# Patient Record
Sex: MALE | Race: WHITE | NOT HISPANIC OR LATINO | Employment: FULL TIME | URBAN - METROPOLITAN AREA
[De-identification: names, ages, dates, MRNs, and addresses within clinical notes are randomized per-mention and may not be internally consistent; named-entity substitution may affect disease eponyms.]

---

## 2021-04-16 ENCOUNTER — PATIENT MESSAGE (OUTPATIENT)
Dept: RESEARCH | Facility: HOSPITAL | Age: 36
End: 2021-04-16

## 2022-01-12 ENCOUNTER — HOSPITAL ENCOUNTER (EMERGENCY)
Facility: HOSPITAL | Age: 37
Discharge: HOME | End: 2022-01-12
Attending: EMERGENCY MEDICINE
Payer: COMMERCIAL

## 2022-01-12 VITALS
BODY MASS INDEX: 29.82 KG/M2 | HEART RATE: 84 BPM | WEIGHT: 225 LBS | SYSTOLIC BLOOD PRESSURE: 136 MMHG | TEMPERATURE: 99 F | HEIGHT: 73 IN | DIASTOLIC BLOOD PRESSURE: 81 MMHG | RESPIRATION RATE: 20 BRPM | OXYGEN SATURATION: 98 %

## 2022-01-12 DIAGNOSIS — R00.2 PALPITATIONS: ICD-10-CM

## 2022-01-12 DIAGNOSIS — R53.83 FATIGUE: ICD-10-CM

## 2022-01-12 DIAGNOSIS — T75.4XXA ELECTROCUTION: Primary | ICD-10-CM

## 2022-01-12 PROBLEM — R07.9 CHEST PAIN: Status: ACTIVE | Noted: 2022-01-12

## 2022-01-12 LAB
ALBUMIN SERPL BCP-MCNC: 4.5 G/DL (ref 3.5–5.2)
ALP SERPL-CCNC: 88 U/L (ref 55–135)
ALT SERPL W/O P-5'-P-CCNC: 35 U/L (ref 10–44)
ANION GAP SERPL CALC-SCNC: 10 MMOL/L (ref 8–16)
AST SERPL-CCNC: 30 U/L (ref 10–40)
AV INDEX (PROSTH): 0.85
AV MEAN GRADIENT: 6 MMHG
AV PEAK GRADIENT: 9 MMHG
AV VALVE AREA: 2.84 CM2
AV VELOCITY RATIO: 0.89
BASOPHILS # BLD AUTO: 0.06 K/UL (ref 0–0.2)
BASOPHILS NFR BLD: 0.6 % (ref 0–1.9)
BILIRUB SERPL-MCNC: 0.3 MG/DL (ref 0.1–1)
BILIRUB UR QL STRIP: NEGATIVE
BSA FOR ECHO PROCEDURE: 2.29 M2
BUN SERPL-MCNC: 14 MG/DL (ref 6–20)
CALCIUM SERPL-MCNC: 9.6 MG/DL (ref 8.7–10.5)
CHLORIDE SERPL-SCNC: 111 MMOL/L (ref 95–110)
CK SERPL-CCNC: 207 U/L (ref 20–200)
CK SERPL-CCNC: 251 U/L (ref 20–200)
CLARITY UR: CLEAR
CO2 SERPL-SCNC: 22 MMOL/L (ref 23–29)
COLOR UR: YELLOW
CREAT SERPL-MCNC: 1.1 MG/DL (ref 0.5–1.4)
CV ECHO LV RWT: 0.27 CM
DIFFERENTIAL METHOD: ABNORMAL
DOP CALC AO PEAK VEL: 1.5 M/S
DOP CALC AO VTI: 26.87 CM
DOP CALC LVOT AREA: 3.4 CM2
DOP CALC LVOT DIAMETER: 2.07 CM
DOP CALC LVOT PEAK VEL: 1.33 M/S
DOP CALC LVOT STROKE VOLUME: 76.39 CM3
DOP CALCLVOT PEAK VEL VTI: 22.71 CM
E WAVE DECELERATION TIME: 203.67 MSEC
E/A RATIO: 1.57
E/E' RATIO: 6.27 M/S
ECHO LV POSTERIOR WALL: 0.68 CM (ref 0.6–1.1)
EJECTION FRACTION: 55 %
EOSINOPHIL # BLD AUTO: 0.2 K/UL (ref 0–0.5)
EOSINOPHIL NFR BLD: 1.6 % (ref 0–8)
ERYTHROCYTE [DISTWIDTH] IN BLOOD BY AUTOMATED COUNT: 14.1 % (ref 11.5–14.5)
EST. GFR  (AFRICAN AMERICAN): >60 ML/MIN/1.73 M^2
EST. GFR  (NON AFRICAN AMERICAN): >60 ML/MIN/1.73 M^2
FRACTIONAL SHORTENING: 41 % (ref 28–44)
GLUCOSE SERPL-MCNC: 91 MG/DL (ref 70–110)
GLUCOSE UR QL STRIP: NEGATIVE
HCT VFR BLD AUTO: 43.2 % (ref 40–54)
HGB BLD-MCNC: 14.5 G/DL (ref 14–18)
HGB UR QL STRIP: NEGATIVE
IMM GRANULOCYTES # BLD AUTO: 0.02 K/UL (ref 0–0.04)
IMM GRANULOCYTES NFR BLD AUTO: 0.2 % (ref 0–0.5)
INTERVENTRICULAR SEPTUM: 0.92 CM (ref 0.6–1.1)
IVRT: 100.35 MSEC
KETONES UR QL STRIP: NEGATIVE
LA MAJOR: 5.08 CM
LA MINOR: 5.16 CM
LA WIDTH: 4.45 CM
LEFT ATRIUM SIZE: 3.49 CM
LEFT ATRIUM VOLUME INDEX: 29.9 ML/M2
LEFT ATRIUM VOLUME: 67.58 CM3
LEFT INTERNAL DIMENSION IN SYSTOLE: 3.02 CM (ref 2.1–4)
LEFT VENTRICLE DIASTOLIC VOLUME INDEX: 55.62 ML/M2
LEFT VENTRICLE DIASTOLIC VOLUME: 125.69 ML
LEFT VENTRICLE MASS INDEX: 63 G/M2
LEFT VENTRICLE SYSTOLIC VOLUME INDEX: 15.7 ML/M2
LEFT VENTRICLE SYSTOLIC VOLUME: 35.5 ML
LEFT VENTRICULAR INTERNAL DIMENSION IN DIASTOLE: 5.13 CM (ref 3.5–6)
LEFT VENTRICULAR MASS: 141.89 G
LEUKOCYTE ESTERASE UR QL STRIP: NEGATIVE
LV LATERAL E/E' RATIO: 4.95 M/S
LV SEPTAL E/E' RATIO: 8.55 M/S
LYMPHOCYTES # BLD AUTO: 1.4 K/UL (ref 1–4.8)
LYMPHOCYTES NFR BLD: 14.2 % (ref 18–48)
MCH RBC QN AUTO: 31 PG (ref 27–31)
MCHC RBC AUTO-ENTMCNC: 33.6 G/DL (ref 32–36)
MCV RBC AUTO: 93 FL (ref 82–98)
MONOCYTES # BLD AUTO: 0.4 K/UL (ref 0.3–1)
MONOCYTES NFR BLD: 4.4 % (ref 4–15)
MV PEAK A VEL: 0.6 M/S
MV PEAK E VEL: 0.94 M/S
MV STENOSIS PRESSURE HALF TIME: 59.06 MS
MV VALVE AREA P 1/2 METHOD: 3.73 CM2
NEUTROPHILS # BLD AUTO: 7.9 K/UL (ref 1.8–7.7)
NEUTROPHILS NFR BLD: 79 % (ref 38–73)
NITRITE UR QL STRIP: NEGATIVE
NRBC BLD-RTO: 0 /100 WBC
PH UR STRIP: 8 [PH] (ref 5–8)
PISA TR MAX VEL: 2.28 M/S
PLATELET # BLD AUTO: 312 K/UL (ref 150–450)
PMV BLD AUTO: 10.7 FL (ref 9.2–12.9)
POTASSIUM SERPL-SCNC: 4 MMOL/L (ref 3.5–5.1)
PROT SERPL-MCNC: 7.8 G/DL (ref 6–8.4)
PROT UR QL STRIP: ABNORMAL
PULM VEIN S/D RATIO: 0.8
PV PEAK D VEL: 0.56 M/S
PV PEAK S VEL: 0.45 M/S
PV PEAK VELOCITY: 0.97 CM/S
RA MAJOR: 4.67 CM
RA PRESSURE: 3 MMHG
RA WIDTH: 4.72 CM
RBC # BLD AUTO: 4.67 M/UL (ref 4.6–6.2)
RIGHT VENTRICULAR END-DIASTOLIC DIMENSION: 3.62 CM
RV TISSUE DOPPLER FREE WALL SYSTOLIC VELOCITY 1 (APICAL 4 CHAMBER VIEW): 14.05 CM/S
SINUS: 3.38 CM
SODIUM SERPL-SCNC: 143 MMOL/L (ref 136–145)
SP GR UR STRIP: 1.03 (ref 1–1.03)
STJ: 2.59 CM
TDI LATERAL: 0.19 M/S
TDI SEPTAL: 0.11 M/S
TDI: 0.15 M/S
TR MAX PG: 21 MMHG
TRICUSPID ANNULAR PLANE SYSTOLIC EXCURSION: 2.27 CM
TROPONIN I SERPL DL<=0.01 NG/ML-MCNC: 0.03 NG/ML (ref 0–0.03)
TROPONIN I SERPL DL<=0.01 NG/ML-MCNC: 0.04 NG/ML (ref 0–0.03)
TV REST PULMONARY ARTERY PRESSURE: 24 MMHG
URN SPEC COLLECT METH UR: ABNORMAL
UROBILINOGEN UR STRIP-ACNC: NEGATIVE EU/DL
WBC # BLD AUTO: 10.02 K/UL (ref 3.9–12.7)

## 2022-01-12 PROCEDURE — 99284 EMERGENCY DEPT VISIT MOD MDM: CPT | Mod: 25,,, | Performed by: INTERNAL MEDICINE

## 2022-01-12 PROCEDURE — 99285 EMERGENCY DEPT VISIT HI MDM: CPT | Mod: 25

## 2022-01-12 PROCEDURE — 63600175 PHARM REV CODE 636 W HCPCS: Performed by: EMERGENCY MEDICINE

## 2022-01-12 PROCEDURE — 84484 ASSAY OF TROPONIN QUANT: CPT | Mod: 91 | Performed by: EMERGENCY MEDICINE

## 2022-01-12 PROCEDURE — 96360 HYDRATION IV INFUSION INIT: CPT | Mod: 59

## 2022-01-12 PROCEDURE — 96361 HYDRATE IV INFUSION ADD-ON: CPT

## 2022-01-12 PROCEDURE — 93010 ELECTROCARDIOGRAM REPORT: CPT | Mod: ,,, | Performed by: INTERNAL MEDICINE

## 2022-01-12 PROCEDURE — 25000003 PHARM REV CODE 250: Performed by: EMERGENCY MEDICINE

## 2022-01-12 PROCEDURE — 99284 PR EMERGENCY DEPT VISIT,LEVEL IV: ICD-10-PCS | Mod: 25,,, | Performed by: INTERNAL MEDICINE

## 2022-01-12 PROCEDURE — 85025 COMPLETE CBC W/AUTO DIFF WBC: CPT | Performed by: EMERGENCY MEDICINE

## 2022-01-12 PROCEDURE — 82550 ASSAY OF CK (CPK): CPT | Mod: 91 | Performed by: EMERGENCY MEDICINE

## 2022-01-12 PROCEDURE — 93010 EKG 12-LEAD: ICD-10-PCS | Mod: ,,, | Performed by: INTERNAL MEDICINE

## 2022-01-12 PROCEDURE — 80053 COMPREHEN METABOLIC PANEL: CPT | Performed by: EMERGENCY MEDICINE

## 2022-01-12 PROCEDURE — 90471 IMMUNIZATION ADMIN: CPT | Performed by: EMERGENCY MEDICINE

## 2022-01-12 PROCEDURE — 90715 TDAP VACCINE 7 YRS/> IM: CPT | Performed by: EMERGENCY MEDICINE

## 2022-01-12 PROCEDURE — 81003 URINALYSIS AUTO W/O SCOPE: CPT | Performed by: EMERGENCY MEDICINE

## 2022-01-12 PROCEDURE — 93005 ELECTROCARDIOGRAM TRACING: CPT

## 2022-01-12 RX ADMIN — TETANUS TOXOID, REDUCED DIPHTHERIA TOXOID AND ACELLULAR PERTUSSIS VACCINE, ADSORBED 0.5 ML: 5; 2.5; 8; 8; 2.5 SUSPENSION INTRAMUSCULAR at 02:01

## 2022-01-12 RX ADMIN — SODIUM CHLORIDE 1000 ML: 0.9 INJECTION, SOLUTION INTRAVENOUS at 01:01

## 2022-01-12 NOTE — Clinical Note
"Norman Quintanale Bello was seen and treated in our emergency department on 1/12/2022.  He may return to work on 01/15/2022.       If you have any questions or concerns, please don't hesitate to call.      Francisco Maciel MD"

## 2022-01-12 NOTE — ASSESSMENT & PLAN NOTE
Patient had atypical chest pain and palpitations after being electrocuted.  No arrhythmia detected on tele.  Echo showed normal left ventricle systolic function.  Troponins minimally elevated but flat trend.  Has been chest pain-free.  No further ischemic workup indicated during current hospitalization.  Follow-up in Cardiology Clinic with me.  He if recurrent palpitations, consider event monitor as an outpatient.  If recurrent angina/anginal equivalent symptoms, consider stress test as an outpatient.
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
## 2022-01-12 NOTE — SUBJECTIVE & OBJECTIVE
No past medical history on file.    Past Surgical History:   Procedure Laterality Date    CLAVICLE SURGERY      Metal implanted       Review of patient's allergies indicates:  No Known Allergies    No current facility-administered medications on file prior to encounter.     Current Outpatient Medications on File Prior to Encounter   Medication Sig    alprazolam (XANAX) 0.5 MG tablet Take 1 tablet (0.5 mg total) by mouth nightly as needed for Anxiety.    azithromycin (Z-HERNESTO) 250 MG tablet Take 1 tablet (250 mg total) by mouth once daily. 1 pack, take as directed    clindamycin (CLEOCIN T) 1 % external solution Apply topically 2 (two) times daily.    clonazePAM (KLONOPIN) 1 MG tablet Take 1 tablet (1 mg total) by mouth 2 (two) times daily.    cyclobenzaprine (FLEXERIL) 10 MG tablet TAKE ONE TABLET BY MOUTH EVERY EVENING FOR MUSCLE SPASMS    fexofenadine (ALLEGRA) 180 MG tablet Take 1 tablet (180 mg total) by mouth once daily.    fluticasone propionate (FLONASE) 50 mcg/actuation nasal spray INHALE 1 PUFF IN EACH NOSTRIL ONCE DAILY FOR ALLERGY & OR SINUS SYMPTOMS    HYDROcodone-acetaminophen (NORCO) 5-325 mg per tablet Take 1 tablet by mouth daily as needed for Pain.    ibuprofen-famotidine (DUEXIS) 800-26.6 mg Tab Take 1 tablet by mouth 3 (three) times daily.    lamotrigine (LAMICTAL) 100 MG tablet Take 1 tablet (100 mg total) by mouth 2 (two) times daily.    lamoTRIgine (LAMICTAL) 100 MG tablet Take 1 tablet (100 mg total) by mouth 2 (two) times daily.    loratadine (CLARITIN) 10 mg tablet Take 1 tablet (10 mg total) by mouth once daily.    meclizine (ANTIVERT) 25 mg tablet Take 1 tablet (25 mg total) by mouth 3 (three) times daily as needed.    melatonin 5 mg Tab Take 1 tablet (5 mg total) by mouth once daily.    mupirocin (BACTROBAN) 2 % ointment Apply topically 2 (two) times daily. AAA    omeprazole (PRILOSEC) 40 MG capsule Take 1 capsule (40 mg total) by mouth 2 (two) times daily before meals.     QUEtiapine (SEROQUEL) 50 MG tablet Take 1 tablet (50 mg total) by mouth every evening.    sertraline (ZOLOFT) 100 MG tablet TAKE 1 AND 1/2 TABLETS BY MOUTH ONCE DAILY.    sertraline (ZOLOFT) 50 MG tablet TAKE ONE TABLET BY MOUTH ONCE DAILY FOR MOOD    sildenafil (VIAGRA) 100 MG tablet Take 1 tablet (100 mg total) by mouth daily as needed for Erectile Dysfunction.    tamsulosin (FLOMAX) 0.4 mg Cap Take 1 capsule (0.4 mg total) by mouth once daily.     Family History     Problem Relation (Age of Onset)    Aneurysm Mother    Diabetes Mother    Heart disease Mother    Hyperlipidemia Mother    Hypertension Mother    Skin cancer Mother    Stroke Mother        Tobacco Use    Smoking status: Never Smoker    Smokeless tobacco: Not on file   Substance and Sexual Activity    Alcohol use: Not on file    Drug use: No    Sexual activity: Yes     Partners: Female     Review of Systems   Constitutional: Negative.   HENT: Negative.    Eyes: Negative.    Cardiovascular: Positive for chest pain and palpitations.   Respiratory: Negative.    Endocrine: Negative.    Hematologic/Lymphatic: Negative.    Skin: Negative.    Musculoskeletal: Negative.    Gastrointestinal: Negative.    Genitourinary: Negative.    Neurological: Negative.    Psychiatric/Behavioral: Negative.    Allergic/Immunologic: Negative.      Objective:     Vital Signs (Most Recent):  Temp: 99 °F (37.2 °C) (01/12/22 1617)  Pulse: 84 (01/12/22 1617)  Resp: 20 (01/12/22 1617)  BP: 136/81 (01/12/22 1617)  SpO2: 98 % (01/12/22 1617) Vital Signs (24h Range):  Temp:  [98.9 °F (37.2 °C)-99.1 °F (37.3 °C)] 99 °F (37.2 °C)  Pulse:  [] 84  Resp:  [16-20] 20  SpO2:  [96 %-98 %] 98 %  BP: (132-144)/(81-91) 136/81     Weight: 102.1 kg (225 lb)  Body mass index is 29.69 kg/m².    SpO2: 98 %  O2 Device (Oxygen Therapy): room air      Intake/Output Summary (Last 24 hours) at 1/12/2022 1628  Last data filed at 1/12/2022 1532  Gross per 24 hour   Intake 2031.3 ml   Output --    Net 2031.3 ml       Lines/Drains/Airways     Peripheral Intravenous Line                 Peripheral IV - Single Lumen 01/12/22 1200 18 G Right Hand <1 day         Peripheral IV - Single Lumen 01/12/22 18 G Anterior;Right Hand <1 day                Physical Exam  Vitals reviewed.   Constitutional:       Appearance: He is well-developed and well-nourished.   HENT:      Head: Normocephalic.   Eyes:      Conjunctiva/sclera: Conjunctivae normal.      Pupils: Pupils are equal, round, and reactive to light.   Cardiovascular:      Rate and Rhythm: Normal rate and regular rhythm.      Heart sounds: Normal heart sounds.   Pulmonary:      Effort: Pulmonary effort is normal.      Breath sounds: Normal breath sounds.   Abdominal:      General: Bowel sounds are normal.      Palpations: Abdomen is soft.   Musculoskeletal:      Cervical back: Normal range of motion and neck supple.   Skin:     General: Skin is warm.   Neurological:      Mental Status: He is alert and oriented to person, place, and time.         Significant Labs:   BMP:   Recent Labs   Lab 01/12/22  1205   GLU 91      K 4.0   *   CO2 22*   BUN 14   CREATININE 1.1   CALCIUM 9.6   , CMP   Recent Labs   Lab 01/12/22  1205      K 4.0   *   CO2 22*   GLU 91   BUN 14   CREATININE 1.1   CALCIUM 9.6   PROT 7.8   ALBUMIN 4.5   BILITOT 0.3   ALKPHOS 88   AST 30   ALT 35   ANIONGAP 10   ESTGFRAFRICA >60   EGFRNONAA >60   , CBC   Recent Labs   Lab 01/12/22  1205   WBC 10.02   HGB 14.5   HCT 43.2      , INR No results for input(s): INR, PROTIME in the last 48 hours., Lipid Panel No results for input(s): CHOL, HDL, LDLCALC, TRIG, CHOLHDL in the last 48 hours., Troponin   Recent Labs   Lab 01/12/22  1205 01/12/22  1533   TROPONINI 0.039* 0.033*    and All pertinent lab results from the last 24 hours have been reviewed.    Significant Imaging: Echocardiogram:   Transthoracic echo (TTE) complete (Cupid Only):   Results for orders placed or performed  during the hospital encounter of 01/12/22   Echo   Result Value Ref Range    BSA 2.29 m2    TDI SEPTAL 0.11 m/s    LV LATERAL E/E' RATIO 4.95 m/s    LV SEPTAL E/E' RATIO 8.55 m/s    LA WIDTH 4.45 cm    TDI LATERAL 0.19 m/s    PV PEAK VELOCITY 0.97 cm/s    LVIDd 5.13 3.5 - 6.0 cm    IVS 0.92 0.6 - 1.1 cm    Posterior Wall 0.68 0.6 - 1.1 cm    LVIDs 3.02 2.1 - 4.0 cm    FS 41 28 - 44 %    LA volume 67.58 cm3    Sinus 3.38 cm    STJ 2.59 cm    LV mass 141.89 g    LA size 3.49 cm    RVDD 3.62 cm    TAPSE 2.27 cm    RV S' 14.05 cm/s    Left Ventricle Relative Wall Thickness 0.27 cm    AV mean gradient 6 mmHg    AV valve area 2.84 cm2    AV Velocity Ratio 0.89     AV index (prosthetic) 0.85     MV valve area p 1/2 method 3.73 cm2    E/A ratio 1.57     Mean e' 0.15 m/s    E wave deceleration time 203.67 msec    IVRT 100.35 msec    Pulm vein S/D ratio 0.80     LVOT diameter 2.07 cm    LVOT area 3.4 cm2    LVOT peak ang 1.33 m/s    LVOT peak VTI 22.71 cm    Ao peak ang 1.50 m/s    Ao VTI 26.87 cm    LVOT stroke volume 76.39 cm3    AV peak gradient 9 mmHg    E/E' ratio 6.27 m/s    MV Peak E Ang 0.94 m/s    TR Max Ang 2.28 m/s    MV stenosis pressure 1/2 time 59.06 ms    MV Peak A Ang 0.60 m/s    PV Peak S Ang 0.45 m/s    PV Peak D Ang 0.56 m/s    LV Systolic Volume 35.50 mL    LV Systolic Volume Index 15.7 mL/m2    LV Diastolic Volume 125.69 mL    LV Diastolic Volume Index 55.62 mL/m2    LA Volume Index 29.9 mL/m2    LV Mass Index 63 g/m2    RA Major Axis 4.67 cm    Left Atrium Minor Axis 5.16 cm    Left Atrium Major Axis 5.08 cm    Triscuspid Valve Regurgitation Peak Gradient 21 mmHg    RA Width 4.72 cm    Right Atrial Pressure (from IVC) 3 mmHg    EF 55 %    TV rest pulmonary artery pressure 24 mmHg    Narrative    · The estimated ejection fraction is 55%.  · Normal systolic function.  · Normal left ventricular diastolic function.  · Normal right ventricular size with normal right ventricular systolic   function.  · Mild  left atrial enlargement.  · Normal central venous pressure (3 mmHg).  · The estimated PA systolic pressure is 24 mmHg.

## 2022-01-12 NOTE — ED NOTES
Pt to ED via EMS for electric shock while working.  Pt A/Ox4, states intermittent chest pain/tingling, radiating up L side of neck and associated SOB with CP.  Pt currently denies pain, denies SOB, skin warm with no discoloration.  Pt has small abrasion to R wrist, cleansed with NS and gauze, dressing placed, no active bleeding noted.  Pt HR NSR on tele, RR even/unlabored.  Pt denies LOC, denies N/V.  Bed low/locked, siderails upx2, pt agrees to plan of care.

## 2023-07-20 ENCOUNTER — HOSPITAL ENCOUNTER (EMERGENCY)
Facility: HOSPITAL | Age: 38
Discharge: HOME | End: 2023-07-20
Attending: EMERGENCY MEDICINE
Payer: COMMERCIAL

## 2023-07-20 VITALS
SYSTOLIC BLOOD PRESSURE: 128 MMHG | DIASTOLIC BLOOD PRESSURE: 85 MMHG | RESPIRATION RATE: 18 BRPM | HEART RATE: 89 BPM | BODY MASS INDEX: 31.15 KG/M2 | WEIGHT: 230 LBS | HEIGHT: 72 IN | OXYGEN SATURATION: 97 % | TEMPERATURE: 99 F

## 2023-07-20 DIAGNOSIS — R93.89 ABNORMAL X-RAY: ICD-10-CM

## 2023-07-20 DIAGNOSIS — M25.532 LEFT WRIST PAIN: ICD-10-CM

## 2023-07-20 DIAGNOSIS — M79.643 TENDERNESS OF ANATOMICAL SNUFFBOX: ICD-10-CM

## 2023-07-20 DIAGNOSIS — S69.92XA INJURY OF LEFT WRIST, INITIAL ENCOUNTER: Primary | ICD-10-CM

## 2023-07-20 DIAGNOSIS — R20.2 TINGLING: ICD-10-CM

## 2023-07-20 PROCEDURE — 99283 EMERGENCY DEPT VISIT LOW MDM: CPT

## 2023-07-20 PROCEDURE — 29125 APPL SHORT ARM SPLINT STATIC: CPT | Mod: LT

## 2023-07-20 PROCEDURE — 25000003 PHARM REV CODE 250: Performed by: NURSE PRACTITIONER

## 2023-07-20 RX ORDER — KETOROLAC TROMETHAMINE 10 MG/1
10 TABLET, FILM COATED ORAL EVERY 8 HOURS PRN
Qty: 10 TABLET | Refills: 0 | Status: SHIPPED | OUTPATIENT
Start: 2023-07-20 | End: 2023-07-23

## 2023-07-20 RX ORDER — KETOROLAC TROMETHAMINE 10 MG/1
10 TABLET, FILM COATED ORAL
Status: COMPLETED | OUTPATIENT
Start: 2023-07-20 | End: 2023-07-20

## 2023-07-20 RX ORDER — KETOROLAC TROMETHAMINE 30 MG/ML
30 INJECTION, SOLUTION INTRAMUSCULAR; INTRAVENOUS
Status: DISCONTINUED | OUTPATIENT
Start: 2023-07-20 | End: 2023-07-20

## 2023-07-20 RX ADMIN — KETOROLAC TROMETHAMINE 10 MG: 10 TABLET, FILM COATED ORAL at 12:07

## 2023-07-20 NOTE — DISCHARGE INSTRUCTIONS
§ Please return to the Emergency Department for any new or worsening symptoms including: fever, chest pain, shortness of breath, loss of consciousness, dizziness, weakness,  or any other concerns.     § Schedule an appointment for follow up with Orthopedics as soon as possible for a recheck of your symptoms. If you do not have one, contact the one listed on your discharge paperwork or call the [facility] Appointment Desk at [phone] to schedule an appointment.     § If you require follow up care from a specialist and are unable to schedule an appointment with them directly, please contact your Primary Care Provider on the next business day to set up a referral.      § Please take all medication as prescribed. You have been prescribed Toradol for pain. This is an Non-Steroidal Anti-Inflammatory (NSAID) Medication. Please do not take any additional NSAIDs while you are taking this medication including (Advil, Aleve, Motrin, Ibuprofen, Mobic\meloxicam, Naprosyn, Toradol, ketoralac, etc.). Please stop taking this medication if you experience: weakness, itching, yellow skin or eyes, joint pains, vomiting blood, blood or black stools, unusual weight gain, or swelling in your arms, legs, hands, or feet.     There is a concern that you may have injured your scaphoid bone in the left wrist that is not showing up on today's x-ray. Please keep your splint on and dry until you are seen by Orthopedics and they tell you that you are OK to remove it. Rest and Elevate the extremity to help reduce swelling and pain.

## 2023-07-20 NOTE — Clinical Note
"Norman Quintanale Bello was seen and treated in our emergency department on 7/20/2023.  He may return to work after being cleared by follow-up physician 07/20/2023.  Can return to work after being cleared by Orthopedics.     If you have any questions or concerns, please don't hesitate to call.      Alexy Odonnell MD"

## 2023-07-20 NOTE — ED PROVIDER NOTES
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 No deformity noted, has full range of motion, and PMS intact. C/o numbness/tingling in last 3 fingers.      CC: Wrist Pain/Injury    HPI: Norman Bello, a 38 y.o. male presents to the ED with left wrist pain following an injury that occurred just prior to arrival.  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  Upon pulling the nida towards him, felt a pop-sensation in the left wrist. Since that point, he has been having pain in the wrist with tingling to the 4th and 5th digits. He is able to feel sensation to touch, but has tingling. He is right hand dominant. No other injuries as a result of today's events.     Patient Active Problem List:     Chest pain        The history is provided by the patient. No  was used.   Review of patient's allergies indicates:  No Known Allergies  History reviewed. No pertinent past medical history.  Past Surgical History:   Procedure Laterality Date    CLAVICLE SURGERY      Metal implanted     Family History   Problem Relation Age of Onset    Heart disease Mother     Hyperlipidemia Mother     Hypertension Mother     Stroke Mother     Diabetes Mother     Aneurysm Mother     Skin cancer Mother      Social History     Tobacco Use    Smoking status: Never   Substance Use Topics    Drug use: No     Review of Systems   Constitutional:  Negative for fever.   Gastrointestinal:  Negative for vomiting.   Musculoskeletal:  Positive for arthralgias and joint swelling. Negative for neck pain and neck stiffness.   Skin:  Negative for color change, rash and wound.   Neurological:  Negative for weakness and numbness.        +Tingling left 4th and 5th digits.    Psychiatric/Behavioral:  Negative  for confusion.      Physical Exam     Initial Vitals [07/20/23 1220]   BP Pulse Resp Temp SpO2   135/86 105 18 98.3 °F (36.8 °C) 96 %      MAP       --         Physical Exam    Nursing note and vitals reviewed.  Constitutional: He appears well-developed and well-nourished. He is not diaphoretic. He is cooperative.  Non-toxic appearance. He does not have a sickly appearance. He does not appear ill. No distress.   HENT:   Head: Normocephalic and atraumatic.   Right Ear: External ear normal.   Left Ear: External ear normal.   Nose: Nose normal.   Mouth/Throat: Mucous membranes are normal. No trismus in the jaw.   Neck: Phonation normal.   Normal range of motion.  Cardiovascular:  Normal rate, regular rhythm and intact distal pulses.           Pulses:       Radial pulses are 2+ on the left side.        Dorsalis pedis pulses are 2+ on the left side.   Pulmonary/Chest: No respiratory distress.   Musculoskeletal:      Left wrist: Swelling (mild), tenderness and snuff box tenderness present. No effusion or crepitus. Normal pulse.      Cervical back: Normal range of motion.     Neurological: He is alert and oriented to person, place, and time. No sensory deficit. Coordination normal. GCS eye subscore is 4. GCS verbal subscore is 5. GCS motor subscore is 6.   Skin: Skin is warm, dry and intact. Capillary refill takes less than 2 seconds. No bruising, no laceration and no rash noted. No cyanosis or erythema.   Psychiatric: He has a normal mood and affect. His speech is normal and behavior is normal. Judgment and thought content normal.       ED Course   Procedures  Labs Reviewed - No data to display       Imaging Results              X-Ray Wrist Complete Left (Final result)  Result time 07/20/23 13:01:18      Final result by Jose Moulton MD (07/20/23 13:01:18)                   Impression:      1. No acute displaced fracture or dislocation of the wrist.      Electronically signed by: Jose Moulton  MD  Date:    07/20/2023  Time:    13:01               Narrative:    EXAMINATION:  XR WRIST COMPLETE 3 VIEWS LEFT    CLINICAL HISTORY:  Injury, unspecified, initial encounter    TECHNIQUE:  PA, lateral, and oblique views of the left wrist were performed.    COMPARISON:  None    FINDINGS:  Three views left wrist.    No acute displaced fracture or dislocation of the wrist.  No radiopaque foreign body.  There may be remote injury of the ulnar styloid versus ossicle.                                       X-Ray Hand 3 View Left (Final result)  Result time 07/20/23 13:00:22      Final result by Jose Moulton MD (07/20/23 13:00:22)                   Impression:      1. No acute displaced fracture or dislocation of the hand noting radiopaque foreign body as above.      Electronically signed by: Jose Moulton MD  Date:    07/20/2023  Time:    13:00               Narrative:    EXAMINATION:  XR HAND COMPLETE 3 VIEW LEFT    CLINICAL HISTORY:  trauma;.    TECHNIQUE:  PA, lateral, and oblique views of the left hand were performed.    COMPARISON:  None    FINDINGS:  Three views left hand.    No acute displaced fracture or dislocation of the hand.  There is a well corticated radiopaque structure along the medial aspect of the 3rd digit proximally, near the interspace.  There may be remote injury involving the ulnar styloid versus ossicle.                                       Medications   ketorolac tablet 10 mg (10 mg Oral Given 7/20/23 1258)           APC / Resident Notes:   This is an evaluation of a 38 y.o. male that presents to the Emergency Department for left wrist pain following injury. Physical Exam shows a non-toxic, afebrile, and well appearing male.  There is tenderness palpation over the generalized dorsal left wrist.  There is some snuffbox tenderness.  Subjective tingling to the left 4th and 5th digits.  Equal strength and sensation with all digits.  2+ left Vital signs are reassuring. If available, previous  records reviewed. RESULTS:  X-ray left wrist without acute displaced fracture dislocation.  Potential remote injury of the ulnar styloid versus accessory ossicle.  Well corticated radiopaque structure along the medial aspect of the 3rd digit proximally.  No acute displaced fracture dislocation. No new/open injury corresponding to radiopaque foreign body.  Suspect old.  Did discuss the remote ulnar injury versus ossicle with the patient.  Does not recall any specific injury previously.    The patient demonstrates a concerning amount of left snuffbox tenderness on examination. Xray done today shows no fracture. However, due to concern for an occult scaphoid fracture, the patient will be placed in a thumb spica splint and instructed to follow up with their PCP or Orthopedics for repeat exam and Xray. I discussed this concern with the patient and emphasized the importance of keeping the hand splinted and obtaining appropriate follow up.    My overall impression is left wrist pain and swelling following an injury with tingling to the 4th and 5th digits and tenderness over the anatomic snuffbox. I considered, but at this time, do not suspect acute displaced fracture, dislocation, septic joint, cellulitis, compartment syndrome.  Given the concern for scaphoid injury, will have the patient follow up with Orthopedics for definitive management evaluation.    Discharge Meds/Instructions:  Toradol, splint instructions, orthopedic follow-up instructions. The diagnosis, treatment plan, instructions for follow-up as well as ED return precautions were discussed. All questions have been answered.  ALAN Strickland, FNP-C                   Clinical Impression:   Final diagnoses:  [S69.92XA] Injury of left wrist, initial encounter (Primary)  [R20.2] Tingling  [M79.643] Tenderness of anatomical snuffbox  [M25.532] Left wrist pain  [R93.89] Abnormal x-ray - Remote injury of the ulnar styloid versus ossicle. Radiopaque foreign body of  3rd digit - remote.         ED Disposition Condition    Discharge Stable          ED Prescriptions       Medication Sig Dispense Start Date End Date Auth. Provider    ketorolac (TORADOL) 10 mg tablet Take 1 tablet (10 mg total) by mouth every 8 (eight) hours as needed for Pain. 10 tablet 7/20/2023 7/23/2023 ALEXANDER Koo          Follow-up Information       Follow up With Specialties Details Why Contact Info    Ronnie Alejandro MD Orthopedic Surgery Call in 1 day To discuss your ED visit & schedule follow-up [address]  [facility] [address]  [phone]      [facility] [place] [facility] [place] Go to  If symptoms worsen [address]  [facility] [id number] [phone]             ALEXANDER Koo  07/20/23 1404

## 2023-07-20 NOTE — Clinical Note
"Norman Quintanale Bello was seen and treated in our emergency department on 7/20/2023.  He may return to work on 07/20/2023.       If you have any questions or concerns, please don't hesitate to call.      ALEXANDER Koo"

## 2023-11-28 ENCOUNTER — OFFICE VISIT (OUTPATIENT)
Dept: URGENT CARE | Facility: CLINIC | Age: 38
End: 2023-11-28
Payer: COMMERCIAL

## 2023-11-28 VITALS
WEIGHT: 229 LBS | SYSTOLIC BLOOD PRESSURE: 128 MMHG | TEMPERATURE: 98 F | HEIGHT: 72 IN | BODY MASS INDEX: 31.02 KG/M2 | RESPIRATION RATE: 18 BRPM | DIASTOLIC BLOOD PRESSURE: 87 MMHG | HEART RATE: 72 BPM | OXYGEN SATURATION: 96 %

## 2023-11-28 DIAGNOSIS — S61.213A LACERATION OF LEFT MIDDLE FINGER WITHOUT FOREIGN BODY WITHOUT DAMAGE TO NAIL, INITIAL ENCOUNTER: Primary | ICD-10-CM

## 2023-11-28 PROCEDURE — 12001 LACERATION REPAIR: ICD-10-PCS | Mod: S$GLB,,, | Performed by: NURSE PRACTITIONER

## 2023-11-28 PROCEDURE — 99203 PR OFFICE/OUTPT VISIT, NEW, LEVL III, 30-44 MIN: ICD-10-PCS | Mod: 25,S$GLB,, | Performed by: NURSE PRACTITIONER

## 2023-11-28 PROCEDURE — 12001 RPR S/N/AX/GEN/TRNK 2.5CM/<: CPT | Mod: S$GLB,,, | Performed by: NURSE PRACTITIONER

## 2023-11-28 PROCEDURE — 99203 OFFICE O/P NEW LOW 30 MIN: CPT | Mod: 25,S$GLB,, | Performed by: NURSE PRACTITIONER

## 2023-11-28 RX ORDER — CEPHALEXIN 500 MG/1
500 CAPSULE ORAL EVERY 12 HOURS
Qty: 10 CAPSULE | Refills: 0 | Status: SHIPPED | OUTPATIENT
Start: 2023-11-28 | End: 2023-12-03

## 2023-11-28 RX ORDER — MUPIROCIN 20 MG/G
OINTMENT TOPICAL 3 TIMES DAILY
Qty: 30 G | Refills: 0 | Status: SHIPPED | OUTPATIENT
Start: 2023-11-28

## 2023-11-28 NOTE — PROGRESS NOTES
Subjective:      Patient ID: Norman Bello is a 38 y.o. male.    Vitals:  height is 6' (1.829 m) and weight is 103.9 kg (229 lb). His tympanic temperature is 97.7 °F (36.5 °C). His blood pressure is 128/87 and his pulse is 72. His respiration is 18 and oxygen saturation is 96%.     Chief Complaint: Laceration    38-year-old male presents to clinic for evaluation of laceration to left middle finger, sustained today while using a .  Patient states that he thoroughly cleansed wound at home, wrapped to control bleeding.  He denies any numbness or tingling.  Denies any motor dysfunction.  He is awake and alert, answers questions appropriately, no acute distress noted on today's visit.    Laceration   The incident occurred 1 to 3 hours ago. The laceration is located on the Left hand. The laceration is 4 cm in size. The laceration mechanism was a blunt object. The pain is at a severity of 6/10. The pain is moderate. The pain has been Constant since onset. It is unknown if a foreign body is present. His tetanus status is unknown.       Constitution: Negative for activity change, appetite change, chills, sweating and fatigue.   Cardiovascular: Negative.    Respiratory: Negative.     Gastrointestinal: Negative.    Skin:  Positive for laceration. Negative for erythema.   Neurological: Negative.       Objective:     Physical Exam   Constitutional: He is oriented to person, place, and time. He appears well-developed.  Non-toxic appearance. He does not appear ill.   HENT:   Head: Normocephalic and atraumatic. Head is without abrasion, without contusion and without laceration.   Ears:   Right Ear: External ear normal.   Left Ear: External ear normal.   Nose: Nose normal.   Mouth/Throat: Oropharynx is clear and moist and mucous membranes are normal.   Eyes: Conjunctivae, EOM and lids are normal.   Neck: Trachea normal and phonation normal. Neck supple.   Cardiovascular: Normal rate and regular rhythm.   Pulmonary/Chest:  Effort normal. No respiratory distress.   Abdominal: Normal appearance.   Musculoskeletal: Normal range of motion.         General: Normal range of motion.      Left hand: Left middle finger: Injuries: laceration (2cm laceration).   Neurological: He is alert and oriented to person, place, and time.   Skin: Skin is warm, dry, intact and no rash. lesion No abrasion, No burn, No bruising, No erythema and No ecchymosis   Psychiatric: His speech is normal and behavior is normal. Mood, judgment and thought content normal.   Nursing note and vitals reviewed.        Laceration Repair    Date/Time: 11/28/2023 4:15 PM    Performed by: Yoel Cheng NP  Authorized by: Yoel Cheng NP  Body area: upper extremity  Location details: left long finger  Laceration length: 2 cm  Foreign bodies: no foreign bodies  Tendon involvement: none  Nerve involvement: none  Vascular damage: no  Anesthesia: local infiltration    Anesthesia:  Local Anesthetic: lidocaine 1% without epinephrine  Anesthetic total: 1 mL  Irrigation solution: saline  Irrigation method: syringe  Amount of cleaning: standard  Debridement: none  Degree of undermining: none  Skin closure: 4-0 nylon  Number of sutures: 3  Technique: simple  Approximation: close  Approximation difficulty: simple  Dressing: antibiotic ointment, splint for protection and non-stick sterile dressing  Patient tolerance: Patient tolerated the procedure well with no immediate complications          Assessment:     1. Laceration of left middle finger without foreign body without damage to nail, initial encounter        Plan:       Laceration of left middle finger without foreign body without damage to nail, initial encounter  -     mupirocin (BACTROBAN) 2 % ointment; Apply topically 3 (three) times daily.  Dispense: 30 g; Refill: 0  -     cephALEXin (KEFLEX) 500 MG capsule; Take 1 capsule (500 mg total) by mouth every 12 (twelve) hours. for 5 days  Dispense: 10 capsule; Refill: 0  -      Laceration Repair    - tetanus up-to-date as of 2022    Patient Instructions   - Follow up with your PCP or specialty clinic as directed in the next 1-2 weeks if not improved or as needed.  You can call [phone] to schedule an appointment with the appropriate provider.    - Go to the ER or seek medical attention immediately if you develop new or worsening symptoms.    - You must understand that you have received an Urgent Care treatment only and that you may be released before all of your medical problems are known or treated.   - You, the patient, will arrange for follow up care as instructed.   - If your condition worsens or fails to improve we recommend that you receive another evaluation at the ER immediately or contact your PCP to discuss your concerns or return here.     Laceration Home Care Instructions     Tylenol for pain     Keep your dressing on for 24 hours. Do not wet laceration for 12 hours.  After 24 hours remove the dressing and gently clean wound with soap and water at least twice daily unless more frequently soiled, then clean more frequently.    May apply topical antibiotic (avoid neosporin) to wound to promote healing.   Clean old antibiotic ointment away before new application.    DO NOT REMOVE SUTURES YOURSELF.    PLEASE RETURN HERE OR ANOTHER [facility] IN 7-10 DAYS FOR SUTURE OR STAPLE REMOVAL  Signs of infection include:  Increase in redness, increase in pain, purulent (pus) drainage. Contact clinic if infection concerns arise.   Do not apply a great deal of tension or stress to the suture line.  Keep covered when you are out and about, if the dressing becomes wet, change it immediately. Keep open to air when at home.

## 2023-11-28 NOTE — PATIENT INSTRUCTIONS
- Follow up with your PCP or specialty clinic as directed in the next 1-2 weeks if not improved or as needed.  You can call [phone] to schedule an appointment with the appropriate provider.    - Go to the ER or seek medical attention immediately if you develop new or worsening symptoms.    - You must understand that you have received an Urgent Care treatment only and that you may be released before all of your medical problems are known or treated.   - You, the patient, will arrange for follow up care as instructed.   - If your condition worsens or fails to improve we recommend that you receive another evaluation at the ER immediately or contact your PCP to discuss your concerns or return here.     Laceration Home Care Instructions     Tylenol for pain     Keep your dressing on for 24 hours. Do not wet laceration for 12 hours.  After 24 hours remove the dressing and gently clean wound with soap and water at least twice daily unless more frequently soiled, then clean more frequently.    May apply topical antibiotic (avoid neosporin) to wound to promote healing.   Clean old antibiotic ointment away before new application.    DO NOT REMOVE SUTURES YOURSELF.    PLEASE RETURN HERE OR ANOTHER [facility] IN 7-10 DAYS FOR SUTURE OR STAPLE REMOVAL  Signs of infection include:  Increase in redness, increase in pain, purulent (pus) drainage. Contact clinic if infection concerns arise.   Do not apply a great deal of tension or stress to the suture line.  Keep covered when you are out and about, if the dressing becomes wet, change it immediately. Keep open to air when at home.

## 2024-11-04 ENCOUNTER — PATIENT MESSAGE (OUTPATIENT)
Dept: RESEARCH | Facility: HOSPITAL | Age: 39
End: 2024-11-04
Payer: COMMERCIAL